# Patient Record
Sex: FEMALE
[De-identification: names, ages, dates, MRNs, and addresses within clinical notes are randomized per-mention and may not be internally consistent; named-entity substitution may affect disease eponyms.]

---

## 2023-12-12 PROBLEM — Z00.00 ENCOUNTER FOR PREVENTIVE HEALTH EXAMINATION: Status: ACTIVE | Noted: 2023-12-12

## 2024-01-05 ENCOUNTER — APPOINTMENT (OUTPATIENT)
Dept: NEUROLOGY | Facility: CLINIC | Age: 64
End: 2024-01-05

## 2024-01-19 ENCOUNTER — APPOINTMENT (OUTPATIENT)
Dept: NEUROLOGY | Facility: CLINIC | Age: 64
End: 2024-01-19
Payer: COMMERCIAL

## 2024-01-19 VITALS — WEIGHT: 140 LBS | BODY MASS INDEX: 23.9 KG/M2 | HEIGHT: 64 IN

## 2024-01-19 VITALS — HEART RATE: 83 BPM | DIASTOLIC BLOOD PRESSURE: 73 MMHG | SYSTOLIC BLOOD PRESSURE: 117 MMHG

## 2024-01-19 DIAGNOSIS — Z86.79 PERSONAL HISTORY OF OTHER DISEASES OF THE CIRCULATORY SYSTEM: ICD-10-CM

## 2024-01-19 DIAGNOSIS — Z82.49 FAMILY HISTORY OF ISCHEMIC HEART DISEASE AND OTHER DISEASES OF THE CIRCULATORY SYSTEM: ICD-10-CM

## 2024-01-19 DIAGNOSIS — Z78.9 OTHER SPECIFIED HEALTH STATUS: ICD-10-CM

## 2024-01-19 DIAGNOSIS — Z87.09 PERSONAL HISTORY OF OTHER DISEASES OF THE RESPIRATORY SYSTEM: ICD-10-CM

## 2024-01-19 DIAGNOSIS — Z86.39 PERSONAL HISTORY OF OTHER ENDOCRINE, NUTRITIONAL AND METABOLIC DISEASE: ICD-10-CM

## 2024-01-19 DIAGNOSIS — Z83.3 FAMILY HISTORY OF DIABETES MELLITUS: ICD-10-CM

## 2024-01-19 DIAGNOSIS — F17.200 NICOTINE DEPENDENCE, UNSPECIFIED, UNCOMPLICATED: ICD-10-CM

## 2024-01-19 PROCEDURE — 99204 OFFICE O/P NEW MOD 45 MIN: CPT

## 2024-01-19 RX ORDER — GABAPENTIN 300 MG/1
300 CAPSULE ORAL 3 TIMES DAILY
Qty: 90 | Refills: 2 | Status: ACTIVE | COMMUNITY
Start: 2024-01-19 | End: 1900-01-01

## 2024-01-19 RX ORDER — GABAPENTIN 100 MG
100 TABLET ORAL
Refills: 0 | Status: ACTIVE | COMMUNITY

## 2024-01-24 NOTE — ASSESSMENT
[FreeTextEntry1] : Chronic Lumbar Radiculopathy/peripheral neuropathy - MRI of the lumbar spine without Donny.  - EMG/NCS of lower extremities. - Trial of physical therapy. - Trial of Gabapentin 300mg TID. I Warned her of side effects including Drowsiness. Patient reported understanding. -Follow-up with pain management pain Management.   Total clinician time spent  is  46 minutes including preparing to see the patient, obtaining and/or reviewing and confirming history, performing a medically necessary and appropriate examination, counseling and educating the patient and/or family, documenting clinical information in the HER and communicating and/or referring to other healthcare professionals.   I, Natalie Kenney, Attest that this documentation has been prepared under the direction and in the presence of Provider Arvind Smart DO  Thank You for letting me assist in the management of this patient.   Arvind Smart DO Board Certified, Neurology

## 2024-01-24 NOTE — PHYSICAL EXAM
[Person] : oriented to person [Place] : oriented to place [Time] : oriented to time [Concentration Intact] : normal concentrating ability [Visual Intact] : visual attention was ~T not ~L decreased [Naming Objects] : no difficulty naming common objects [Repeating Phrases] : no difficulty repeating a phrase [Writing A Sentence] : no difficulty writing a sentence [Fluency] : fluency intact [Comprehension] : comprehension intact [Reading] : reading intact [Past History] : adequate knowledge of personal past history [Cranial Nerves Optic (II)] : visual acuity intact bilaterally,  visual fields full to confrontation, pupils equal round and reactive to light [Cranial Nerves Oculomotor (III)] : extraocular motion intact [Cranial Nerves Trigeminal (V)] : facial sensation intact symmetrically [Cranial Nerves Facial (VII)] : face symmetrical [Cranial Nerves Vestibulocochlear (VIII)] : hearing was intact bilaterally [Cranial Nerves Glossopharyngeal (IX)] : tongue and palate midline [Cranial Nerves Accessory (XI - Cranial And Spinal)] : head turning and shoulder shrug symmetric [Cranial Nerves Hypoglossal (XII)] : there was no tongue deviation with protrusion [Motor Tone] : muscle tone was normal in all four extremities [Motor Strength] : muscle strength was normal in all four extremities [No Muscle Atrophy] : normal bulk in all four extremities [Sensation Tactile Decrease] : light touch was intact [Abnormal Walk] : normal gait [Balance] : balance was intact [2+] : Ankle jerk left 2+ [Tremor] : no tremor present [Past-pointing] : there was no past-pointing [1+] : Ankle jerk left 1+ [Plantar Reflex Right Only] : normal on the right [Plantar Reflex Left Only] : normal on the left [FreeTextEntry7] : Decree sensation distally [FreeTextEntry8] : Positive Romberg, trouble with tandem

## 2024-01-24 NOTE — HISTORY OF PRESENT ILLNESS
[FreeTextEntry1] : It is a pleasure to see MRS. BROWNE In the office today. She is a 63-Year-old Woman who presents to the office today for the evaluation of low back pain that radiates to the lower extremities. This started 8 years ago, but the leg pain progressed over the course of one year. The pain is a daily chronic pain in the calf tissue. Hypertension, diabetes which are both well controlled and she is unsure of her most recent A1C. She adds of recently moving to [place] and is working on getting her team of doctors. COPD is well monitored. She was then seen by another provider who started her on a low dose of 100mg Gabapentin. However, based off of her pain level, she is open to follow up with pain management along with completing our neurological workup we are sending her for. Pinched nerve in the back which was thought to be secondary to neuropathy. Her leg pain can be severe at times. There is difficulty when lifting, extending the legs.

## 2024-02-02 ENCOUNTER — APPOINTMENT (OUTPATIENT)
Dept: NEUROLOGY | Facility: CLINIC | Age: 64
End: 2024-02-02
Payer: COMMERCIAL

## 2024-02-02 PROCEDURE — 95886 MUSC TEST DONE W/N TEST COMP: CPT

## 2024-02-02 PROCEDURE — 95912 NRV CNDJ TEST 11-12 STUDIES: CPT

## 2024-02-16 ENCOUNTER — APPOINTMENT (OUTPATIENT)
Dept: NEUROLOGY | Facility: CLINIC | Age: 64
End: 2024-02-16

## 2024-04-05 ENCOUNTER — APPOINTMENT (OUTPATIENT)
Dept: NEUROLOGY | Facility: CLINIC | Age: 64
End: 2024-04-05
Payer: COMMERCIAL

## 2024-04-05 DIAGNOSIS — M54.16 RADICULOPATHY, LUMBAR REGION: ICD-10-CM

## 2024-04-05 DIAGNOSIS — Z86.69 PERSONAL HISTORY OF OTHER DISEASES OF THE NERVOUS SYSTEM AND SENSE ORGANS: ICD-10-CM

## 2024-04-05 PROCEDURE — 99213 OFFICE O/P EST LOW 20 MIN: CPT

## 2024-04-05 RX ORDER — GABAPENTIN 600 MG/1
600 TABLET, COATED ORAL 3 TIMES DAILY
Qty: 180 | Refills: 1 | Status: ACTIVE | COMMUNITY
Start: 2024-04-05 | End: 1900-01-01

## 2024-04-05 NOTE — ASSESSMENT
[FreeTextEntry1] : Chronic lumbar radiculopathy - Will obtain recent MRI of the lumbar spine done from [facility] for review - Physical therapy - Increase gabapentin to 600 mg 3 times daily.  She was told that gabapentin works best if taking on the standing dose, and she was also p warned of the potential side effect of medication and she reported understanding  -If not better, will refer to pain management  Total clinician time spent  is  21 minutes including preparing to see the patient, obtaining and/or reviewing and confirming history, performing a medically necessary and appropriate examination, counseling and educating the patient and/or family, documenting clinical information in the HER and communicating and/or referring to other healthcare professionals.

## 2024-04-05 NOTE — HISTORY OF PRESENT ILLNESS
[FreeTextEntry1] : Ms. FRANSICO BROWNE returns to the office for follow-up and her prior history and physical have been reviewed and she reports no change since last visit.  She was last seen for chronic lumbar radiculopathy which was severe and was sent for MRI of the lumbar spine as well as EMG nerve conduction study of the lower extremities.  MRI of the lumbar spine was done but the result of which is not available to us at this time.  Her EMG nerve conduction study of the lower extremity was normal.  She was given gabapentin 300 mg 3 times daily however she has been taking it more on as-needed basis with Tylenol with minimal relief, but no side effects.  She was supposed to be set up for pain management which she did not do and decided to start physical therapy that she has not started doing yet.  She wants to start therapy first before considering injections.

## 2024-04-05 NOTE — PHYSICAL EXAM
[Person] : oriented to person [Place] : oriented to place [Time] : oriented to time [Concentration Intact] : normal concentrating ability [Visual Intact] : visual attention was ~T not ~L decreased [Naming Objects] : no difficulty naming common objects [Repeating Phrases] : no difficulty repeating a phrase [Writing A Sentence] : no difficulty writing a sentence [Fluency] : fluency intact [Comprehension] : comprehension intact [Reading] : reading intact [Past History] : adequate knowledge of personal past history [Cranial Nerves Optic (II)] : visual acuity intact bilaterally,  visual fields full to confrontation, pupils equal round and reactive to light [Cranial Nerves Oculomotor (III)] : extraocular motion intact [Cranial Nerves Trigeminal (V)] : facial sensation intact symmetrically [Cranial Nerves Facial (VII)] : face symmetrical [Cranial Nerves Vestibulocochlear (VIII)] : hearing was intact bilaterally [Cranial Nerves Glossopharyngeal (IX)] : tongue and palate midline [Cranial Nerves Accessory (XI - Cranial And Spinal)] : head turning and shoulder shrug symmetric [Cranial Nerves Hypoglossal (XII)] : there was no tongue deviation with protrusion [Motor Tone] : muscle tone was normal in all four extremities [Motor Strength] : muscle strength was normal in all four extremities [No Muscle Atrophy] : normal bulk in all four extremities [Balance] : balance was intact [Past-pointing] : there was no past-pointing [Tremor] : no tremor present [1+] : Ankle jerk left 1+ [Plantar Reflex Right Only] : normal on the right [Plantar Reflex Left Only] : normal on the left [FreeTextEntry7] : Decree sensation distally [FreeTextEntry8] : Positive Romberg, trouble with tandem

## 2024-06-07 ENCOUNTER — APPOINTMENT (OUTPATIENT)
Dept: NEUROLOGY | Facility: CLINIC | Age: 64
End: 2024-06-07

## 2025-04-09 ENCOUNTER — OUTPATIENT (OUTPATIENT)
Dept: OUTPATIENT SERVICES | Facility: HOSPITAL | Age: 65
LOS: 1 days | Discharge: HOME | End: 2025-04-09
Payer: COMMERCIAL

## 2025-04-09 ENCOUNTER — TRANSCRIPTION ENCOUNTER (OUTPATIENT)
Age: 65
End: 2025-04-09

## 2025-04-09 VITALS — SYSTOLIC BLOOD PRESSURE: 139 MMHG | DIASTOLIC BLOOD PRESSURE: 73 MMHG | HEART RATE: 86 BPM | RESPIRATION RATE: 15 BRPM

## 2025-04-09 VITALS
OXYGEN SATURATION: 96 % | DIASTOLIC BLOOD PRESSURE: 83 MMHG | WEIGHT: 139.99 LBS | HEIGHT: 61 IN | SYSTOLIC BLOOD PRESSURE: 165 MMHG | TEMPERATURE: 98 F | HEART RATE: 89 BPM | RESPIRATION RATE: 17 BRPM

## 2025-04-09 DIAGNOSIS — Z79.84 LONG TERM (CURRENT) USE OF ORAL HYPOGLYCEMIC DRUGS: ICD-10-CM

## 2025-04-09 DIAGNOSIS — E11.36 TYPE 2 DIABETES MELLITUS WITH DIABETIC CATARACT: ICD-10-CM

## 2025-04-09 DIAGNOSIS — Z98.890 OTHER SPECIFIED POSTPROCEDURAL STATES: Chronic | ICD-10-CM

## 2025-04-09 DIAGNOSIS — I10 ESSENTIAL (PRIMARY) HYPERTENSION: ICD-10-CM

## 2025-04-09 DIAGNOSIS — J44.9 CHRONIC OBSTRUCTIVE PULMONARY DISEASE, UNSPECIFIED: ICD-10-CM

## 2025-04-09 DIAGNOSIS — H25.811 COMBINED FORMS OF AGE-RELATED CATARACT, RIGHT EYE: ICD-10-CM

## 2025-04-09 DIAGNOSIS — H25.11 AGE-RELATED NUCLEAR CATARACT, RIGHT EYE: ICD-10-CM

## 2025-04-09 LAB — GLUCOSE BLDC GLUCOMTR-MCNC: 162 MG/DL — HIGH (ref 70–99)

## 2025-04-09 PROCEDURE — V2632: CPT

## 2025-04-09 PROCEDURE — 82962 GLUCOSE BLOOD TEST: CPT

## 2025-04-09 NOTE — ASU PATIENT PROFILE, ADULT - FALL HARM RISK - HARM RISK INTERVENTIONS

## 2025-04-09 NOTE — ASU PATIENT PROFILE, ADULT - HISTORY OF COVID-19 VACCINATION
Indication: Abdominal pain

 

Technique: Continuous helical transaxial imaging of the abdomen and pelvis was

obtained from the lung bases to the pubic symphysis during intravenous contrast

administration. Coronal 2-D reformats were also obtained. Study obtained in a Siemens

sensation 64 slice CT.  Automatic Exposure Control was utilized.

 

Total Dose length Product (DLP):  589.55 mGycm

 

CT Dose Index Volume (CTDIvol):   11.8 mGy

 

Comparison: None

 

Findings: The lung bases are clear. Small hiatal hernia is present. There are

multiple gallstones gallbladder wall thickening and wall ill-definition.

Cholecystitis suspected. The CBD is dilated and there is evidence of

choledocholithiasis in the distal part of the CBD.

 

Aorta iliac calcifications are present. Appendix is retrocecal and appears normal.

There is no free fluid. No evidence of bowel obstruction. No hydronephrosis seen.

Left renal hypodensity, nonspecific, but probably cystic. The sacroiliac joints are

fused.

 

IMPRESSION:

 

Choledocholithiasis with mild biliary ductal dilatation. Evaluation with ERCP is

recommended.

 

Cholelithiasis with mild ill-defined gallbladder wall and enhancement suggestive of

cholecystitis. Please correlate clinically.

 

Other incidental findings as above

 

[facility] has communicated the preliminary results to the Emergency

Department.  Their findings are largely concordant with this report.

 

 

 

The CT scanner at [facility] is accredited by the [employer] College of

Radiology and the scans are performed using dose optimization techniques as

appropriate to a performed exam including Automatic Exposure control.
Indication: Dyspnea

 

Comparison:  8/4/2018

 

A single view chest radiograph was obtained.

 

Findings:

 

Cardiomediastinal appearance is within normal limits for age.  Pulmonary vascularity

is appropriate. The diaphragmatic contour is smooth and costophrenic angles are

sharp. No pleural effusions are identified. The bones are osteopenic.

 

Impression: No acute findings
semaglutide (OZEMPIC, 0.25 OR 0.5 MG/DOSE,) 2 MG/1.5ML SOPN pen      Last Written Prescription Date:  4/26/23  Last Fill Quantity: 1.5ml,   # refills: 2  Last Office Visit : 8/3/22  Future Office visit:  9/13/23    Routing refill request to provider for review/approval because:    Alternative requested by pharmacy: 1.5ml version of ozempic 0.25mg is no longer available, would like a new rx for the 3ml version.   
No

## 2025-04-09 NOTE — ASU DISCHARGE PLAN (ADULT/PEDIATRIC) - NS MD DC FALL RISK RISK
For information on Fall & Injury Prevention, visit: [email or web address] OR  [email or web address] OR  https://www.cdc.gov/steadi/patient.html

## 2025-04-09 NOTE — ASU DISCHARGE PLAN (ADULT/PEDIATRIC) - FINANCIAL ASSISTANCE
[facility] provides services at a reduced cost to those who are determined to be eligible through [facility]’s financial assistance program. Information regarding [facility]’s financial assistance program can be found by going to [email or web address] or by calling [phone].

## 2025-04-09 NOTE — ASU PATIENT PROFILE, ADULT - PAIN LOCATION, PROFILE
Not the cause of symptoms currently. No urinary symptoms. Completed course of Bactrum with mixed amarilis culture.    back pain chronic

## 2025-04-09 NOTE — ASU PATIENT PROFILE, ADULT - NSICDXPASTMEDICALHX_GEN_ALL_CORE_FT
PAST MEDICAL HISTORY:  Cataract     COPD, mild     DM (diabetes mellitus)     HTN (hypertension)

## 2025-04-15 PROBLEM — J44.9 CHRONIC OBSTRUCTIVE PULMONARY DISEASE, UNSPECIFIED: Chronic | Status: ACTIVE | Noted: 2025-04-09

## 2025-04-15 PROBLEM — E11.9 TYPE 2 DIABETES MELLITUS WITHOUT COMPLICATIONS: Chronic | Status: ACTIVE | Noted: 2025-04-09

## 2025-04-15 PROBLEM — I10 ESSENTIAL (PRIMARY) HYPERTENSION: Chronic | Status: ACTIVE | Noted: 2025-04-09

## 2025-04-23 ENCOUNTER — OUTPATIENT (OUTPATIENT)
Dept: OUTPATIENT SERVICES | Facility: HOSPITAL | Age: 65
LOS: 1 days | Discharge: HOME | End: 2025-04-23
Payer: COMMERCIAL

## 2025-04-23 VITALS — HEART RATE: 78 BPM | SYSTOLIC BLOOD PRESSURE: 142 MMHG | RESPIRATION RATE: 17 BRPM | DIASTOLIC BLOOD PRESSURE: 70 MMHG

## 2025-04-23 VITALS
HEIGHT: 61 IN | DIASTOLIC BLOOD PRESSURE: 80 MMHG | OXYGEN SATURATION: 97 % | RESPIRATION RATE: 17 BRPM | HEART RATE: 81 BPM | WEIGHT: 145.06 LBS | TEMPERATURE: 97 F | SYSTOLIC BLOOD PRESSURE: 152 MMHG

## 2025-04-23 DIAGNOSIS — H25.12 AGE-RELATED NUCLEAR CATARACT, LEFT EYE: ICD-10-CM

## 2025-04-23 DIAGNOSIS — Z98.890 OTHER SPECIFIED POSTPROCEDURAL STATES: Chronic | ICD-10-CM

## 2025-04-23 LAB — GLUCOSE BLDC GLUCOMTR-MCNC: 107 MG/DL — HIGH (ref 70–99)

## 2025-04-23 PROCEDURE — V2632: CPT

## 2025-04-23 PROCEDURE — 82962 GLUCOSE BLOOD TEST: CPT

## 2025-04-23 RX ORDER — FLUTICASONE FUROATE, UMECLIDINIUM BROMIDE AND VILANTEROL TRIFENATATE 100; 62.5; 25 UG/1; UG/1; UG/1
1 POWDER RESPIRATORY (INHALATION)
Refills: 0 | DISCHARGE

## 2025-04-23 RX ORDER — AMLODIPINE BESYLATE 10 MG/1
1 TABLET ORAL
Refills: 0 | DISCHARGE

## 2025-04-23 RX ORDER — TIZANIDINE 4 MG/1
2 TABLET ORAL
Refills: 0 | DISCHARGE

## 2025-04-23 NOTE — ASU PATIENT PROFILE, ADULT - NSICDXPASTMEDICALHX_GEN_ALL_CORE_FT
PAST MEDICAL HISTORY:  Cataract right IOL    COPD, mild     DM (diabetes mellitus)     HTN (hypertension)

## 2025-04-23 NOTE — ASU PREOP CHECKLIST - BMI (KG/M2)
Patient was already scheduled with an appointment to see Dr. Mayo at the [facility] for 10/13/21 @ 830 am.   27.4

## 2025-04-23 NOTE — ASU PATIENT PROFILE, ADULT - NS TRANSFER PATIENT BELONGINGS
Telephone Encounter by Ewelina Valenzuela NCMA at 10/16/18 11:49 AM     Author:  Ewelina Valenzuela NCMA Service:  (none) Author Type:  Certified Medical Assistant     Filed:  10/16/18 11:50 AM Encounter Date:  10/15/2018 Status:  Signed     :  Ewelina Valenzuela NCMA (Certified Medical Assistant)            Medication refilled per authorization from provider[SB1.1T]        Revision History        User Key Date/Time User Provider Type Action    > SB1.1 10/16/18 11:50 AM Ewelina Valenzuela NCMA Certified Medical Assistant Sign    T - Template            
decreased step length
Clothing

## 2025-04-28 DIAGNOSIS — Z79.51 LONG TERM (CURRENT) USE OF INHALED STEROIDS: ICD-10-CM

## 2025-04-28 DIAGNOSIS — I10 ESSENTIAL (PRIMARY) HYPERTENSION: ICD-10-CM

## 2025-04-28 DIAGNOSIS — J44.9 CHRONIC OBSTRUCTIVE PULMONARY DISEASE, UNSPECIFIED: ICD-10-CM

## 2025-04-28 DIAGNOSIS — H25.812 COMBINED FORMS OF AGE-RELATED CATARACT, LEFT EYE: ICD-10-CM

## 2025-04-28 DIAGNOSIS — Z88.0 ALLERGY STATUS TO PENICILLIN: ICD-10-CM

## 2025-04-28 DIAGNOSIS — E11.9 TYPE 2 DIABETES MELLITUS WITHOUT COMPLICATIONS: ICD-10-CM
